# Patient Record
Sex: FEMALE | Race: WHITE | NOT HISPANIC OR LATINO | Employment: OTHER | URBAN - METROPOLITAN AREA
[De-identification: names, ages, dates, MRNs, and addresses within clinical notes are randomized per-mention and may not be internally consistent; named-entity substitution may affect disease eponyms.]

---

## 2017-04-30 ENCOUNTER — HOSPITAL ENCOUNTER (EMERGENCY)
Facility: OTHER | Age: 57
Discharge: HOME | End: 2017-05-01
Attending: EMERGENCY MEDICINE
Payer: COMMERCIAL

## 2017-04-30 DIAGNOSIS — M54.9 UPPER BACK PAIN ON RIGHT SIDE: Primary | ICD-10-CM

## 2017-04-30 DIAGNOSIS — R09.1 PLEURISY: ICD-10-CM

## 2017-04-30 DIAGNOSIS — R07.9 CHEST PAIN: ICD-10-CM

## 2017-04-30 LAB
BACTERIA #/AREA URNS HPF: ABNORMAL /HPF
BASOPHILS # BLD AUTO: 0.04 K/UL
BASOPHILS NFR BLD: 0.6 %
BILIRUB UR QL STRIP: NEGATIVE
CLARITY UR: CLEAR
COLOR UR: YELLOW
DIFFERENTIAL METHOD: NORMAL
EOSINOPHIL # BLD AUTO: 0.1 K/UL
EOSINOPHIL NFR BLD: 1.4 %
ERYTHROCYTE [DISTWIDTH] IN BLOOD BY AUTOMATED COUNT: 12.6 %
GLUCOSE UR QL STRIP: NEGATIVE
HCT VFR BLD AUTO: 40.4 %
HGB BLD-MCNC: 14.1 G/DL
HGB UR QL STRIP: ABNORMAL
KETONES UR QL STRIP: NEGATIVE
LEUKOCYTE ESTERASE UR QL STRIP: ABNORMAL
LYMPHOCYTES # BLD AUTO: 2.8 K/UL
LYMPHOCYTES NFR BLD: 37.9 %
MCH RBC QN AUTO: 30.1 PG
MCHC RBC AUTO-ENTMCNC: 34.9 %
MCV RBC AUTO: 86 FL
MICROSCOPIC COMMENT: ABNORMAL
MONOCYTES # BLD AUTO: 0.6 K/UL
MONOCYTES NFR BLD: 8.1 %
NEUTROPHILS # BLD AUTO: 3.8 K/UL
NEUTROPHILS NFR BLD: 52 %
NITRITE UR QL STRIP: NEGATIVE
PH UR STRIP: 7 [PH] (ref 5–8)
PLATELET # BLD AUTO: 289 K/UL
PMV BLD AUTO: 10.7 FL
PROT UR QL STRIP: NEGATIVE
RBC # BLD AUTO: 4.69 M/UL
RBC #/AREA URNS HPF: 3 /HPF (ref 0–4)
SP GR UR STRIP: 1.01 (ref 1–1.03)
SQUAMOUS #/AREA URNS HPF: 8 /HPF
URN SPEC COLLECT METH UR: ABNORMAL
UROBILINOGEN UR STRIP-ACNC: NEGATIVE EU/DL
WBC # BLD AUTO: 7.26 K/UL
WBC #/AREA URNS HPF: 2 /HPF (ref 0–5)
WBC CLUMPS URNS QL MICRO: ABNORMAL
YEAST URNS QL MICRO: ABNORMAL

## 2017-04-30 PROCEDURE — 96361 HYDRATE IV INFUSION ADD-ON: CPT

## 2017-04-30 PROCEDURE — 63600175 PHARM REV CODE 636 W HCPCS: Performed by: PHYSICIAN ASSISTANT

## 2017-04-30 PROCEDURE — 96374 THER/PROPH/DIAG INJ IV PUSH: CPT

## 2017-04-30 PROCEDURE — 93010 ELECTROCARDIOGRAM REPORT: CPT | Mod: ,,, | Performed by: INTERNAL MEDICINE

## 2017-04-30 PROCEDURE — 81000 URINALYSIS NONAUTO W/SCOPE: CPT

## 2017-04-30 PROCEDURE — 87086 URINE CULTURE/COLONY COUNT: CPT

## 2017-04-30 PROCEDURE — 80053 COMPREHEN METABOLIC PANEL: CPT

## 2017-04-30 PROCEDURE — 83690 ASSAY OF LIPASE: CPT

## 2017-04-30 PROCEDURE — 85025 COMPLETE CBC W/AUTO DIFF WBC: CPT

## 2017-04-30 PROCEDURE — 84484 ASSAY OF TROPONIN QUANT: CPT

## 2017-04-30 PROCEDURE — 99284 EMERGENCY DEPT VISIT MOD MDM: CPT | Mod: 25

## 2017-04-30 PROCEDURE — 25000003 PHARM REV CODE 250: Performed by: PHYSICIAN ASSISTANT

## 2017-04-30 PROCEDURE — 93005 ELECTROCARDIOGRAM TRACING: CPT

## 2017-04-30 RX ORDER — KETOROLAC TROMETHAMINE 30 MG/ML
15 INJECTION, SOLUTION INTRAMUSCULAR; INTRAVENOUS
Status: COMPLETED | OUTPATIENT
Start: 2017-04-30 | End: 2017-04-30

## 2017-04-30 RX ADMIN — SODIUM CHLORIDE 1000 ML: 0.9 INJECTION, SOLUTION INTRAVENOUS at 11:04

## 2017-04-30 RX ADMIN — KETOROLAC TROMETHAMINE 15 MG: 30 INJECTION, SOLUTION INTRAMUSCULAR at 11:04

## 2017-04-30 NOTE — ED AVS SNAPSHOT
[facility]  [address]  [facility] [address]               Ning Lopez   2017 10:51 PM   ED    Description:  Female : 1960   Department:  [facility]           Your Care was Coordinated By:     Provider Role From To    Naif Gomez MD Attending Provider 17 2301 --    Radha Banks PA-C Physician Assistant 17 2300 --      Reason for Visit     Back Pain           Diagnoses this Visit        Comments    Upper back pain on right side    -  Primary     Chest pain         Pleurisy           ED Disposition     None           To Do List           Follow-up Information     Follow up with RADHA Marino MD In 2 days.    Specialty:  Internal Medicine    Contact information:    [address]  [address]  [facility] [address]  [phone]         These Medications        Disp Refills Start End    diazePAM (VALIUM) 5 MG tablet 15 tablet 0 2017    Take 1 tablet (5 mg total) by mouth every 6 (six) hours as needed (muscle spasm). - Oral      [facility]     [facility] Nurse Care Line - 24 Assistance  Unless otherwise directed by your provider, please contact [facility], our nurse care line that is available for  assistance.     Registered nurses in the [facility] provide: appointment scheduling, clinical advisement, health education, and other advisory services.  Call: [phone] (toll free)               Medications           Message regarding Medications     Verify the changes and/or additions to your medication regime listed below are the same as discussed with your clinician today.  If any of these changes or additions are incorrect, please notify your healthcare provider.        START taking these NEW medications        Refills    diazePAM (VALIUM) 5 MG tablet 0    Sig: Take 1 tablet (5 mg total) by mouth every 6 (six) hours as needed (muscle spasm).    Class: Print     "Route: Oral      These medications were administered today        Dose Freq    sodium chloride 0.9% bolus 1,000 mL 1,000 mL ED 1 Time    Sig: Inject 1,000 mLs into the vein ED 1 Time.    Class: Normal    Route: Intravenous    ketorolac injection 15 mg 15 mg ED 1 Time    Sig: Inject 15 mg into the vein ED 1 Time.    Class: Normal    Route: Intravenous    diazePAM tablet 5 mg 5 mg ED 1 Time    Sig: Take 1 tablet (5 mg total) by mouth ED 1 Time.    Class: Normal    Route: Oral           Verify that the below list of medications is an accurate representation of the medications you are currently taking.  If none reported, the list may be blank. If incorrect, please contact your healthcare provider. Carry this list with you in case of emergency.           Current Medications     diazePAM (VALIUM) 5 MG tablet Take 1 tablet (5 mg total) by mouth every 6 (six) hours as needed (muscle spasm).    diazePAM tablet 5 mg Take 1 tablet (5 mg total) by mouth ED 1 Time.           Clinical Reference Information           Your Vitals Were     BP Pulse Temp Resp Height Weight    167/63 (BP Location: Left arm, Patient Position: Sitting) 73 97.4 °F (36.3 °C) (Oral) 20 5' 6" (1.676 m) 75.9 kg (167 lb 5.3 oz)    SpO2 BMI             97% 27.01 kg/m2         Allergies as of 5/1/2017        Reactions    Codeine Itching      Immunizations Administered on Date of Encounter - 5/1/2017     None      ED Micro, Lab, POCT     Start Ordered       Status Ordering Provider    04/30/17 2311 04/30/17 2310  Urinalysis  STAT      Final result     04/30/17 2310 04/30/17 2310  CBC auto differential  STAT      Final result     04/30/17 2310 04/30/17 2310  Comprehensive metabolic panel  STAT      Final result     04/30/17 2310 04/30/17 2310  Lipase  STAT      Final result     04/30/17 2310 04/30/17 2310  Troponin I  STAT      Final result     04/30/17 2310 04/30/17 2310  Urinalysis Microscopic  Once      Final result       ED Imaging Orders     Start Ordered    "    Status Ordering Provider    05/01/17 0018 05/01/17 0018  CT Renal Stone Study ABD Pelvis WO  1 time imaging      Final result     04/30/17 2310 04/30/17 2310  X-Ray Chest PA And Lateral  1 time imaging      Final result       Discharge References/Attachments     BACK PAIN (ACUTE OR CHRONIC) (ENGLISH)    BACK PAIN, RELIEVING (ENGLISH)    BACK SPASM, NO TRAUMA (ENGLISH)      [facility] Sign-Up     Activating your [facility] account is as easy as 1-2-3!     1) Visit [email or web address], select Sign Up Now, enter this activation code and your date of birth, then select Next.  [email or web address]  Expires: 6/15/2017  1:09 AM      2) Create a username and password to use when you visit [facility] in the future and select a security question in case you lose your password and select Next.    3) Enter your e-mail address and click Sign Up!    Additional Information  If you have questions, please e-mail [email or web address] or call [phone] to talk to our [facility] staff. Remember, [facility] is NOT to be used for urgent needs. For medical emergencies, dial 911.         Smoking Cessation     If you would like to quit smoking:   You may be eligible for free services if you are a [place] resident and started smoking cigarettes before September 1, 1988.  Call the Smoking Cessation Trust ([facility]) toll free at ([phone] [phone] or [phone].   Call 8-387-QUIT-NOW if you do not meet the above criteria.   Contact us via email: [email or web address]   View our website for more information: [email or web address]         [facility] complies with applicable Federal civil rights laws and does not discriminate on the basis of race, color, national origin, age, disability, or sex.        Language Assistance Services     ATTENTION: Language assistance services are available, free of charge. Please call [phone].      ATENCIÓN: Si habla español, tiene a willard disposición servicios gratuitos de asistencia  lingüística. [facility] [phone].     JOE Ý: N?u b?n nói Ti?ng Vi?t, có các d?ch v? h? tr? ngôn ng? mi?n phí dành cho b?n. G?i s? [phone].

## 2017-05-01 VITALS
HEART RATE: 62 BPM | BODY MASS INDEX: 26.89 KG/M2 | DIASTOLIC BLOOD PRESSURE: 73 MMHG | TEMPERATURE: 98 F | HEIGHT: 66 IN | SYSTOLIC BLOOD PRESSURE: 116 MMHG | RESPIRATION RATE: 20 BRPM | WEIGHT: 167.31 LBS | OXYGEN SATURATION: 99 %

## 2017-05-01 LAB
ALBUMIN SERPL BCP-MCNC: 4.6 G/DL
ALP SERPL-CCNC: 44 U/L
ALT SERPL W/O P-5'-P-CCNC: 23 U/L
ANION GAP SERPL CALC-SCNC: 11 MMOL/L
AST SERPL-CCNC: 17 U/L
BILIRUB SERPL-MCNC: 0.6 MG/DL
BUN SERPL-MCNC: 14 MG/DL
CALCIUM SERPL-MCNC: 9.6 MG/DL
CHLORIDE SERPL-SCNC: 104 MMOL/L
CO2 SERPL-SCNC: 26 MMOL/L
CREAT SERPL-MCNC: 0.9 MG/DL
EST. GFR  (AFRICAN AMERICAN): >60 ML/MIN/1.73 M^2
EST. GFR  (NON AFRICAN AMERICAN): >60 ML/MIN/1.73 M^2
GLUCOSE SERPL-MCNC: 82 MG/DL
LIPASE SERPL-CCNC: 23 U/L
POTASSIUM SERPL-SCNC: 3.6 MMOL/L
PROT SERPL-MCNC: 7.9 G/DL
SODIUM SERPL-SCNC: 141 MMOL/L
TROPONIN I SERPL DL<=0.01 NG/ML-MCNC: 0.02 NG/ML

## 2017-05-01 PROCEDURE — 25000003 PHARM REV CODE 250: Performed by: PHYSICIAN ASSISTANT

## 2017-05-01 RX ORDER — DIAZEPAM 5 MG/1
5 TABLET ORAL
Status: COMPLETED | OUTPATIENT
Start: 2017-05-01 | End: 2017-05-01

## 2017-05-01 RX ORDER — DIAZEPAM 5 MG/1
5 TABLET ORAL EVERY 6 HOURS PRN
Qty: 15 TABLET | Refills: 0 | Status: SHIPPED | OUTPATIENT
Start: 2017-05-01 | End: 2017-05-31

## 2017-05-01 RX ADMIN — DIAZEPAM 5 MG: 5 TABLET ORAL at 01:05

## 2017-05-01 NOTE — ED NOTES
Pt c/o back pain radiating through to the chest x 4 days, worse w/ movement and deep breathing, non- radiating.

## 2017-05-01 NOTE — ED NOTES
Pt resting on recliner. Pt reports back pain 3 out of 10 at rest, 10 out of 10 with movement. Respirations even and unlabored, no distress noted. Call bell within reach, will continue to monitor.

## 2017-05-01 NOTE — ED PROVIDER NOTES
"Encounter Date: 4/30/2017       History     Chief Complaint   Patient presents with    Back Pain     w/ chest pain x 4 days, worse when breathing deep, pain is to the middle of the back     Review of patient's allergies indicates:   Allergen Reactions    Codeine Itching     HPI Comments: 56-year-old female with history of ischemic colitis presents to the emergency department with complaints of right upper/mid back pain with associated intermittent chest pain that is worse with taking a deep breath.  States the pain has been present for the last 4 days.  States the pain is a 6 out of 10 when present.  She states that it comes" waves."  She admits to taking Tylenol today with no relief.  She denies fever, chills, cough, congestion, nausea, vomiting, abdominal pain, hematuria, dysuria or other symptoms.  She admits to a 30 year smoking history however states that she stopped 2 years ago and now "vapes."    The history is provided by the patient.     Past Medical History:   Diagnosis Date    Colitis, ischemic     ITP secondary to infection      Past Surgical History:   Procedure Laterality Date    BREAST LUMPECTOMY       History reviewed. No pertinent family history.  Social History   Substance Use Topics    Smoking status: Current Every Day Smoker     Packs/day: 2.00    Smokeless tobacco: None    Alcohol use Yes     Review of Systems   Constitutional: Negative for chills and fever.   HENT: Negative for sore throat.    Respiratory: Negative for cough, chest tightness, shortness of breath and wheezing.    Cardiovascular: Positive for chest pain.   Gastrointestinal: Negative for abdominal pain, diarrhea, nausea and vomiting.   Genitourinary: Negative for difficulty urinating, dysuria, frequency, hematuria and urgency.   Musculoskeletal: Positive for back pain. Negative for neck pain and neck stiffness.   Skin: Negative for rash.   Neurological: Negative for dizziness, syncope, weakness, light-headedness, numbness " and headaches.   Hematological: Does not bruise/bleed easily.       Physical Exam   Initial Vitals   BP Pulse Resp Temp SpO2   04/30/17 2129 04/30/17 2129 04/30/17 2129 04/30/17 2129 04/30/17 2129   167/63 73 20 97.4 °F (36.3 °C) 97 %     Physical Exam    Nursing note and vitals reviewed.  Constitutional: She appears well-developed and well-nourished. She is not diaphoretic.  Non-toxic appearance. No distress.   HENT:   Head: Normocephalic and atraumatic.   Right Ear: External ear normal.   Left Ear: External ear normal.   Nose: Nose normal.   Mouth/Throat: Oropharynx is clear and moist. No oropharyngeal exudate.   Eyes: Conjunctivae, EOM and lids are normal. Pupils are equal, round, and reactive to light. No scleral icterus.   Neck: Normal range of motion and phonation normal. Neck supple.   Cardiovascular: Normal rate, regular rhythm, normal heart sounds, intact distal pulses and normal pulses. Exam reveals no gallop, no friction rub and no decreased pulses.    No murmur heard.  Pulmonary/Chest: Effort normal and breath sounds normal. No respiratory distress. She has no decreased breath sounds. She has no wheezes. She has no rhonchi. She has no rales. She exhibits no tenderness.   Abdominal: Soft. Normal appearance and bowel sounds are normal. She exhibits no distension and no mass. There is no tenderness. There is no rigidity, no rebound, no guarding, no CVA tenderness, no tenderness at McBurney's point and negative Pablo's sign.   Musculoskeletal: Normal range of motion.        Thoracic back: She exhibits pain. She exhibits normal range of motion, no tenderness, no bony tenderness, no swelling, no deformity and no laceration.        Back:         Arms:  No obvious deformities, moving all extremities, normal gait   Neurological: She is alert and oriented to person, place, and time. She has normal strength and normal reflexes. No sensory deficit.   Skin: Skin is warm, dry and intact. No lesion and no rash noted.  No erythema.   Psychiatric: She has a normal mood and affect. Her speech is normal and behavior is normal. Judgment normal. Cognition and memory are normal.         ED Course   Procedures  Labs Reviewed   COMPREHENSIVE METABOLIC PANEL - Abnormal; Notable for the following:        Result Value    Alkaline Phosphatase 44 (*)     All other components within normal limits   URINALYSIS - Abnormal; Notable for the following:     Occult Blood UA 1+ (*)     Leukocytes, UA Trace (*)     All other components within normal limits   URINALYSIS MICROSCOPIC - Abnormal; Notable for the following:     Bacteria, UA Few (*)     All other components within normal limits   CBC W/ AUTO DIFFERENTIAL   LIPASE   TROPONIN I     EKG Readings: (Independently Interpreted)   Initial Reading: No STEMI. Rhythm: Normal Sinus Rhythm. Heart Rate: 65. Ectopy: No Ectopy. Conduction: Normal. ST Segments: Normal ST Segments. T Waves: Normal. Clinical Impression: Normal Sinus Rhythm     Imaging Results         CT Renal Stone Study ABD Pelvis WO (Final result) Result time:  05/01/17 00:55:09    Final result by Srinath Gutierrez MD (05/01/17 00:55:09)    Impression:       No acute intra-abdominal pelvic process.    No evidence of nephrolithiasis or obstructive uropathy.    Scattered colonic diverticula without evidence of acute diverticulitis.    Extensive degenerative changes of the lumbosacral spine.  Outpatient MRI of lumbar spine may be obtained for further evaluation.    Additional findings as above.              Electronically signed by: SRINATH GUTIERREZ MD  Date:     05/01/17  Time:    00:55     Narrative:    Exam: [id number]  05/01/17  00:32:48 [id number] ([facility]) : CT RENAL STONE STUDY ABD PELVIS WO    Technique:    This exam was done specifically to evaluate for renal stone disease as per request.  Therefore, no oral and no IV contrast was used. Using helical CT technique, axial images of the abdomen and pelvis were obtained from the top of the liver through the  base of the bladder.    Comparison:     None     Findings:      There are dependent changes in the lung bases.  The heart is normal in appearance.  There are no pericardial effusions.  The vessels are unremarkable.  There is no evidence of lymphadenopathy in the abdomen or pelvis.    The esophagus, stomach, and the duodenum are within normal limits.  The small bowel loops are unremarkable.  The terminal ileum is within normal limits.  The appendix is unremarkable.  There are minimal scattered colonic diverticuli without evidence of acute diverticulitis.  There is moderate amount of stool within the colon.    The liver, gallbladder, and the biliary tree are within normal limits.  The spleen is unremarkable.  Multiple splenules are present.  The pancreas is within normal limits.  The adrenal glands are unremarkable.    The kidneys, ureters, and the urinary bladder are within normal limits.  There is a pessary device in place.    There is no evidence of free fluid or in the abdomen or pelvis.  There is no evidence of free air.  There is no evidence of pneumatosis.    There are degenerative changes involving the lumbosacral spine.  Multiple disc protrusions are noted in the lower lumbar spine.  No destructive osseous lesions are identified.  There is a small 1.2 cm fluid attenuation lesion in the left gluteal region, most like representing a sebaceous cyst.  The remaining abdominal wall is within normal limits.            X-Ray Chest PA And Lateral (Final result) Result time:  04/30/17 23:35:55    Final result by Srinath Gutierrez MD (04/30/17 23:35:55)    Impression:       No acute process.              Electronically signed by: SRINATH GUTIERREZ MD  Date:     04/30/17  Time:    23:35     Narrative:    Exam: [id number]  04/30/17  23:18:11 [id number] ([facility]) : XR CHEST PA AND LATERAL    Technique:    Frontal and lateral chest x-ray    Comparison:     None     Findings:      The trachea is unremarkable.  The cardiomediastinal silhouette is  within normal limits.  The hemidiaphragms are unremarkable.  There are no pleural effusions.  There is no evidence of free air beneath the hemidiaphragms.  There is no evidence of a pneumothorax.  No airspace opacities are present.  The osseous structures are within normal limits.              X-Rays:   Independently Interpreted Readings:   Chest X-Ray: Normal heart size.  No infiltrates.  No acute abnormalities.     Medical Decision Making:   History:   I obtained history from: someone other than patient.       <> Summary of History: spouse  Old Medical Records: I decided to obtain old medical records.  Initial Assessment:   56-year-old female with complaints consistent with right-sided upper back pain concerning for muscle spasm.  Vital signs stable, afebrile, neurovascular intact.  She is alert, healthy and nontoxic appearing.  She is in no apparent distress.  Pain is worse with certain positions.  There is no CVA tenderness palpation.  Pain however overlies at the base of the right lung/kidney.   Independently Interpreted Test(s):   I have ordered and independently interpreted X-rays - see prior notes.  I have ordered and independently interpreted EKG Reading(s) - see prior notes  Clinical Tests:   Lab Tests: Ordered and Reviewed  The following lab test(s) were unremarkable: CBC, CMP, Lipase and Troponin       <> Summary of Lab: Urinalysis with 3 red blood cells, 2 white blood cells and a few bacteria however there are 8 squamous epithelial cells present.  Will obtain urine culture.  Asymptomatic this time and I do not feel that treatment is indicated.  Radiological Study: Ordered and Reviewed  Medical Tests: Ordered and Reviewed  ED Management:  Urine and labs were obtained.  Urine with some blood present.  Labs are benign.  She was administered IV fluids and Toradol in the emergency department.  Chest x-ray was also obtained and independently interpreted by myself with no evidence of infiltrate, pleural  effusion, mass lesion, lesion or pneumothorax.  EKG was obtained from triage and consistent with normal sinus rhythm at a rate of 65 bpm.  I doubt cardiac etiology.  I do not suspect PE at this time based on history and exam.  Due to blood in urine and history of renal calculi did decide to obtain a renal stone study.  There is no acute findings on study to explain patient's symptoms.  At this point I did consider that this may be musculoskeletal in nature and secondary to muscle spasm.  She was administered oral Valium.  I do not feel that further emergent workup is indicated.  We'll discharged home with a prescription for Valium and care instructions.  She is to follow-up with her doctor in the next 48 hours or return for any worsening symptoms.  Will call patient for positive culture results.  She states understanding.  This patient was discussed with the attending physician who agrees with treatment plan.  Other:   I have discussed this case with another health care provider.       <> Summary of the Discussion: Mahesh  This note was created using Dragon Medical dictation.  There may be typographical errors secondary to dictation.                     ED Course     Clinical Impression:     1. Upper back pain on right side    2. Chest pain    3. Pleurisy        Disposition:   Disposition: Discharged  Condition: Stable       Radha Banks PA-C  05/01/17 0113

## 2017-05-02 LAB — BACTERIA UR CULT: NO GROWTH

## 2022-09-27 ENCOUNTER — TELEPHONE (OUTPATIENT)
Dept: PSYCHOLOGY | Facility: CLINIC | Age: 62
End: 2022-09-27
Payer: COMMERCIAL

## 2022-09-27 NOTE — TELEPHONE ENCOUNTER
Called spoke with Mrs. Lopez, she asked to schedule an appt. for her daughter, stated her daughter is above age of 18, stated to Mrs. Lopez that her daughter would have to call directly in to speak with the office to schedule a visit

## 2025-04-08 ENCOUNTER — RESULTS FOLLOW-UP (OUTPATIENT)
Dept: PRIMARY CARE CLINIC | Facility: CLINIC | Age: 65
End: 2025-04-08

## 2025-04-08 ENCOUNTER — OFFICE VISIT (OUTPATIENT)
Dept: PRIMARY CARE CLINIC | Facility: CLINIC | Age: 65
End: 2025-04-08
Payer: MEDICARE

## 2025-04-08 VITALS
OXYGEN SATURATION: 98 % | HEIGHT: 65 IN | BODY MASS INDEX: 28.6 KG/M2 | DIASTOLIC BLOOD PRESSURE: 86 MMHG | SYSTOLIC BLOOD PRESSURE: 130 MMHG | HEART RATE: 72 BPM | RESPIRATION RATE: 18 BRPM | WEIGHT: 171.63 LBS

## 2025-04-08 DIAGNOSIS — R00.2 INTERMITTENT PALPITATIONS: ICD-10-CM

## 2025-04-08 DIAGNOSIS — Z13.6 ENCOUNTER FOR SCREENING FOR CARDIOVASCULAR DISORDERS: ICD-10-CM

## 2025-04-08 DIAGNOSIS — Z51.81 MEDICATION MONITORING ENCOUNTER: ICD-10-CM

## 2025-04-08 DIAGNOSIS — Z11.59 NEED FOR HEPATITIS C SCREENING TEST: ICD-10-CM

## 2025-04-08 DIAGNOSIS — I73.9 CLAUDICATION: ICD-10-CM

## 2025-04-08 DIAGNOSIS — Z76.89 ENCOUNTER TO ESTABLISH CARE: Primary | ICD-10-CM

## 2025-04-08 DIAGNOSIS — Z78.0 ASYMPTOMATIC POSTMENOPAUSAL STATE: ICD-10-CM

## 2025-04-08 DIAGNOSIS — Z12.11 COLON CANCER SCREENING: ICD-10-CM

## 2025-04-08 PROCEDURE — 99204 OFFICE O/P NEW MOD 45 MIN: CPT | Mod: S$PBB,,, | Performed by: STUDENT IN AN ORGANIZED HEALTH CARE EDUCATION/TRAINING PROGRAM

## 2025-04-08 PROCEDURE — 99999 PR PBB SHADOW E&M-EST. PATIENT-LVL IV: CPT | Mod: PBBFAC,,, | Performed by: STUDENT IN AN ORGANIZED HEALTH CARE EDUCATION/TRAINING PROGRAM

## 2025-04-08 PROCEDURE — 99214 OFFICE O/P EST MOD 30 MIN: CPT | Mod: PBBFAC,PN | Performed by: STUDENT IN AN ORGANIZED HEALTH CARE EDUCATION/TRAINING PROGRAM

## 2025-04-08 NOTE — PROGRESS NOTES
Subjective:       Patient ID: Ning Lopez is a 64 y.o. female.    Chief Complaint: Establish Care    HPI:  64 y.o. female presents to [facility] to establish care.    Acute concerns?: Here for annual exam. Hasn't been to provider in 8 years or so.    Does have some pain in legs when walking longer distances. Feels that top of leg hurts more than lower. Does exercise and use stairs regularly.      Last PCP?: Dr. Anibal Marino   Allergies: Codeine  Medical History: Ischemic colitis, ITP  Medications: None  Surgical History: left breast lumpectomy, hysterectomy  Family History: Mother had colon cancer, father lung cancer; no known autoimmune disease; no dementias  Social History:    Last Mammogram through [facility] 2024    Recent stressors?: Dog of 10 years just   Exercise?: No regular exercise  Meditation?: Hasn't in past  Good local support?: Yes  Therapist?: No    Last tetanus vaccine?: Declines  Flu vaccine?: Declines      Review of Systems   Constitutional:  Negative for chills, diaphoresis, fatigue and fever.        Hot flashes nightly. Just came off of hormones   HENT:  Negative for congestion, sinus pressure, sneezing and sore throat.    Respiratory:  Negative for cough and shortness of breath.    Cardiovascular:  Positive for palpitations (Had Cardiology work-up in past and everything was fine. Normal stress test. Over 4 years ago. No recent change in frequency or intensity.). Negative for chest pain.   Gastrointestinal:  Negative for abdominal pain, diarrhea, nausea and vomiting.        Sporadic with ischemic colitis, took Pepcid a few days ago and no recent severe episodes   Musculoskeletal:  Positive for myalgias (Pains in legs while walking long distances). Negative for joint swelling.   Skin:  Negative for rash and wound.       Objective:      Vitals:    25 1005   BP: 130/86   BP Location: Right arm   Patient Position: Sitting   Pulse: 72   Resp: 18   SpO2: 98%   Weight: 77.9 kg (171 lb 10.1 oz)  "  Height: 5' 5" (1.651 m)     Physical Exam  Vitals reviewed.   Constitutional:       General: She is not in acute distress.     Appearance: Normal appearance. She is not ill-appearing.   HENT:      Head: Normocephalic and atraumatic.   Eyes:      General:         Right eye: No discharge.         Left eye: No discharge.      Conjunctiva/sclera: Conjunctivae normal.   Cardiovascular:      Rate and Rhythm: Normal rate and regular rhythm.      Pulses: Normal pulses.      Heart sounds: No murmur heard.  Pulmonary:      Effort: Pulmonary effort is normal.      Breath sounds: Normal breath sounds.   Musculoskeletal:         General: No deformity.      Cervical back: Neck supple. No rigidity.   Lymphadenopathy:      Cervical: No cervical adenopathy.   Skin:     General: Skin is warm and dry.      Coloration: Skin is not jaundiced.   Neurological:      General: No focal deficit present.      Mental Status: She is alert and oriented to person, place, and time.   Psychiatric:         Mood and Affect: Mood normal.         Behavior: Behavior normal.             Lab Results   Component Value Date     02/13/2024    K 3.8 02/13/2024     02/13/2024    CO2 24 02/13/2024    BUN 12 02/13/2024    CREATININE 0.8 02/13/2024    CREATININE 0.8 09/04/2012    GLUCOSE 100 09/04/2012    ANIONGAP 10 02/13/2024    ANIONGAP 10 09/04/2012     No results found for: "HGBA1C"  No results found for: "BNP", "BNPTRIAGEBLO"    Lab Results   Component Value Date    WBC 6.09 02/13/2024    HGB 13.1 02/13/2024    HGB 14.2 09/04/2012    HCT 38.8 02/13/2024     02/13/2024    GRAN 3.6 02/13/2024    GRAN 59.6 02/13/2024     No results found for: "CHOL", "HDL", "LDLCALC", "TRIG"     Current Medications[1]        Assessment:       1. Encounter to establish care    2. Colon cancer screening    3. Asymptomatic postmenopausal state    4. Medication monitoring encounter    5. Need for hepatitis C screening test    6. Encounter for screening for " cardiovascular disorders    7. Claudication    8. Intermittent palpitations           Plan:       Encounter to establish care  - Health maintenance items reviewed today's visit    Colon cancer screening  -     Case Request Endoscopy: COLONOSCOPY    Asymptomatic postmenopausal state  -     DXA Bone Density Axial Skeleton 1 or more sites; Future; Expected date: 04/08/2025    Medication monitoring encounter  -     CBC Auto Differential; Future; Expected date: 04/08/2025  -     Comprehensive Metabolic Panel; Future; Expected date: 04/08/2025    Need for hepatitis C screening test  -     Hepatitis C Antibody; Future; Expected date: 04/08/2025    Encounter for screening for cardiovascular disorders  -     Lipid Panel; Future; Expected date: 04/08/2025    Claudication  -     URINALYSIS; Future; Expected date: 04/08/2025  -     US Ankle Brachial Indices Ext LTD WO Str; Future; Expected date: 04/08/2025  - Will assess SE with claudication symptoms  - Conservative management guidance provided today's visit  - Will assess for dehydration    Intermittent palpitations  -     EKG 12-lead  -     TSH; Future; Expected date: 04/08/2025  - Negative work-up in past    RTC PRN         [1] No current outpatient medications on file.

## 2025-04-08 NOTE — PATIENT INSTRUCTIONS
[facility] recommended   CRYOSURGERY      Your doctor has used a method called cryosurgery to treat your skin condition. Cryosurgery refers to the use of very cold substances to treat a variety of skin conditions such as warts, pre-skin cancers, molluscum contagiosum, sun spots, and several benign growths. The substance we use in cryosurgery is liquid nitrogen and is so cold (-195 degrees Celsius) that is burns when administered.     Following treatment in the office, the skin may immediately burn and become red. You may find the area around the lesion is affected as well. It is sometimes necessary to treat not only the lesion, but a small area of the surrounding normal skin to achieve a good response.     A blister, and even a blood filled blister, may form after treatment.   This is a normal response. If the blister is painful, it is acceptable to sterilize a needle and with rubbing alcohol and gently pop the blister. It is important that you gently wash the area with soap and warm water as the blister fluid may contain wart virus if a wart was treated. Do no remove the roof of the blister.     The area treated can take anywhere from 1-3 weeks to heal. Healing time depends on the kind of skin lesion treated, the location, and how aggressively the lesion was treated. It is recommended that the areas treated are covered with Vaseline or bacitracin ointment and a band-aid. If a band-aid is not practical, just ointment applied several times per day will do. Keeping these areas moist will speed the healing time.    Treatment with liquid nitrogen can leave a scar. In dark skin, it may be a light or dark scar, in light skin it may be a white or pink scar. These will generally fade with time.    If you have any concerns after your treatment, please feel free to call the office.         [facility]  [address]  [place] [address]  Dept: [phone]  Dept Fax: [phone]

## 2025-04-09 ENCOUNTER — TELEPHONE (OUTPATIENT)
Dept: GASTROENTEROLOGY | Facility: CLINIC | Age: 65
End: 2025-04-09
Payer: MEDICARE

## 2025-04-09 NOTE — TELEPHONE ENCOUNTER
Attempted to schedule pt for colonoscopy, pt requested a call back. 1st attempt made to schedule pt with no success. Follow up reminder created to call pt back in approx. 1 week

## 2025-04-10 ENCOUNTER — PATIENT OUTREACH (OUTPATIENT)
Dept: ADMINISTRATIVE | Facility: HOSPITAL | Age: 65
End: 2025-04-10
Payer: MEDICARE

## 2025-04-10 NOTE — PROGRESS NOTES
Health Maintenance Due   Topic Date Due    TETANUS VACCINE  Never done    Mammogram  Never done    Hemoglobin A1c (Diabetic Prevention Screening)  Never done    DEXA Scan  Never done    Shingles Vaccine (1 of 2) Never done    Pneumococcal Vaccines (Age 50+) (1 of 1 - PCV) Never done    Colorectal Cancer Screening  01/18/2022    COVID-19 Vaccine (1 - 2024-25 season) Never done     Immunizations - reviewed and updated   Care Everywhere - triggered   Care Teams - updated   Outreach - Chart review done. Patient seen by Dr. José on 4/8/2025 to establish care  MARYJO sent to [facility] for most recent mammogram records  DEXA scan scheduled for 4/11/2025  Patient requested a call back in regards to scheduling colonoscopy screening

## 2025-04-10 NOTE — LETTER
AUTHORIZATION FOR RELEASE OF   CONFIDENTIAL INFORMATION    Dear [facility],    We are seeing Ning Lopez, date of birth 1960, in the clinic at [facility]. Naif José MD is the patient's PCP. Ning Lopez has an outstanding lab/procedure at the time we reviewed her chart. In order to help keep her health information updated, she has authorized us to request the following medical record(s):        (X)  MAMMOGRAM                                      (  )  COLONOSCOPY      (  )  PAP SMEAR                                          (  )  OUTSIDE LAB RESULTS     (  )  DEXA SCAN                                          (  )  EYE EXAM            (  )  FOOT EXAM                                          (  )  ENTIRE RECORD     (  )  OUTSIDE IMMUNIZATIONS                 (  )  _______________       ATTN: KYMBERLY      Please fax records to Naif José MD, [phone]          Patient Name: Nign Lopez  : 1960  Patient Phone #: [phone]

## 2025-04-16 ENCOUNTER — TELEPHONE (OUTPATIENT)
Dept: GASTROENTEROLOGY | Facility: CLINIC | Age: 65
End: 2025-04-16
Payer: MEDICARE

## 2025-04-23 ENCOUNTER — TELEPHONE (OUTPATIENT)
Dept: GASTROENTEROLOGY | Facility: CLINIC | Age: 65
End: 2025-04-23
Payer: MEDICARE

## 2025-05-02 ENCOUNTER — PATIENT OUTREACH (OUTPATIENT)
Dept: ADMINISTRATIVE | Facility: HOSPITAL | Age: 65
End: 2025-05-02
Payer: MEDICARE

## 2025-05-02 NOTE — LETTER
AUTHORIZATION FOR RELEASE OF   CONFIDENTIAL INFORMATION    Dear [facility],    We are seeing Ning Lopez, date of birth 1960, in the clinic at [facility]. Naif José MD is the patient's PCP. Ning Lopez has an outstanding lab/procedure at the time we reviewed her chart. In order to help keep her health information updated, she has authorized us to request the following medical record(s):        (X)  MAMMOGRAM                                      (  )  COLONOSCOPY      (  )  PAP SMEAR                                          (  )  OUTSIDE LAB RESULTS     (  )  DEXA SCAN                                          (  )  EYE EXAM            (  )  FOOT EXAM                                          (  )  ENTIRE RECORD     (  )  OUTSIDE IMMUNIZATIONS                 (  )  _______________       ATTN: KYMBERLY      Please fax records to Naif José MD, [phone]     If you have any questions, please contact Kymberly at [phone]          Patient Name: Ning Lopez  : 1960  Patient Phone #: [phone]

## 2025-05-02 NOTE — PROGRESS NOTES
Health Maintenance Due   Topic Date Due    TETANUS VACCINE  Never done    Mammogram  Never done    Hemoglobin A1c (Diabetic Prevention Screening)  Never done    Shingles Vaccine (1 of 2) Never done    Pneumococcal Vaccines (Age 50+) (1 of 1 - PCV) Never done    RSV Vaccine (Age 60+ and Pregnant patients) (1 - Risk 60-74 years 1-dose series) Never done    Colorectal Cancer Screening  01/18/2022    COVID-19 Vaccine (1 - 2024-25 season) Never done     Immunizations - reviewed and updated   Care Everywhere - triggered   Care Teams -   Outreach - Chart review done. MARYJO sent to [facility] in [place] for most recent mammogram records